# Patient Record
Sex: MALE | Race: WHITE
[De-identification: names, ages, dates, MRNs, and addresses within clinical notes are randomized per-mention and may not be internally consistent; named-entity substitution may affect disease eponyms.]

---

## 2020-03-13 ENCOUNTER — HOSPITAL ENCOUNTER (EMERGENCY)
Dept: HOSPITAL 62 - ER | Age: 45
Discharge: HOME | End: 2020-03-13
Payer: SELF-PAY

## 2020-03-13 VITALS — SYSTOLIC BLOOD PRESSURE: 128 MMHG | DIASTOLIC BLOOD PRESSURE: 82 MMHG

## 2020-03-13 DIAGNOSIS — F17.200: ICD-10-CM

## 2020-03-13 DIAGNOSIS — R05: ICD-10-CM

## 2020-03-13 DIAGNOSIS — R09.81: ICD-10-CM

## 2020-03-13 DIAGNOSIS — R51: ICD-10-CM

## 2020-03-13 DIAGNOSIS — B34.9: Primary | ICD-10-CM

## 2020-03-13 DIAGNOSIS — M79.10: ICD-10-CM

## 2020-03-13 DIAGNOSIS — R50.9: ICD-10-CM

## 2020-03-13 LAB
A TYPE INFLUENZA AG: NEGATIVE
B INFLUENZA AG: NEGATIVE

## 2020-03-13 PROCEDURE — 87804 INFLUENZA ASSAY W/OPTIC: CPT

## 2020-03-13 PROCEDURE — 99283 EMERGENCY DEPT VISIT LOW MDM: CPT

## 2020-03-13 NOTE — ER DOCUMENT REPORT
HPI





- HPI


Time Seen by Provider: 03/13/20 10:00


Pain Level: Denies


Notes: 





Patient is a 44-year-old male presenting to the emergency department chief 

complaint of cough, congestion, fever, chills and body aches that began 

yesterday.  [place]  Denies any nausea, vomiting, 

diarrhea.  Reports excessive fatigue.








- CONSTITUTIONAL


Constitutional: DENIES: Fever, Chills





- NEURO


Neurology: REPORTS: Headache





- RESPIRATORY


Respiratory: REPORTS: Coughing





Past Medical History





- General


Information source: Patient





- Social History


Smoking Status: Current Every Day Smoker


Frequency of alcohol use: None


Drug Abuse: None


Family History: Reviewed & Not Pertinent


Patient has suicidal ideation: No


Patient has homicidal ideation: No





- Medical History


Medical History: Negative


Surgical Hx: Negative





- Immunizations


Immunizations up to date: Yes





Vertical Provider Document





- CONSTITUTIONAL


Notes: 





PHYSICAL EXAMINATION:





GENERAL: Well-appearing, well-nourished and in no acute distress.





HEAD: Atraumatic, normocephalic.





EYES: Pupils equal round extraocular movements intact,  conjunctiva are normal.





ENT: Nares patent, oropharynx clear, nonerythematous, no tonsillar swelling, 

uvula midline.





NECK: Normal range of motion





LUNGS: No respiratory distress, lung sounds clear and equal bilaterally.  Cough 

noted.





Musculoskeletal: Normal range of motion





NEUROLOGICAL:  Normal speech, normal gait. 





PSYCH: Normal mood, normal affect.





SKIN: Warm, Dry, normal turgor, no rashes or lesions noted.





- INFECTION CONTROL


TRAVEL OUTSIDE OF THE [place] IN LAST 30 DAYS: No





Course





- Re-evaluation


Re-evalutation: 





Patient appears well, nontoxic, physical exam is unremarkable.  Likely viral 

upper respiratory illness.  Patient will be discharged home at this time.  

Patient is in agreements with plan.








- Vital Signs


Vital signs: 


                                        











Temp Pulse Resp BP Pulse Ox


 


 98.7 F   87   18   153/81 H  97 


 


 03/13/20 09:33  03/13/20 09:33  03/13/20 09:33  03/13/20 09:33  03/13/20 09:33














Discharge





- Discharge


Clinical Impression: 


 Viral illness





Condition: Stable


Disposition: HOME, SELF-CARE


Instructions:  Viral Syndrome ([facility])


Additional Instructions: 


Your influenza testing today was negative.  Light you likely have a viral 

illness.  Please push fluids.  Alternate Tylenol and ibuprofen.  Get plenty of 

rest.  Wash your hands.  Follow-up with your primary provider in 3 to 5 days if 

not improving or return to the emergency department if worsening.





Forms:  Return to Work

## 2020-07-28 ENCOUNTER — HOSPITAL ENCOUNTER (EMERGENCY)
Dept: HOSPITAL 62 - ER | Age: 45
Discharge: HOME | End: 2020-07-28
Payer: COMMERCIAL

## 2020-07-28 VITALS — SYSTOLIC BLOOD PRESSURE: 120 MMHG | DIASTOLIC BLOOD PRESSURE: 73 MMHG

## 2020-07-28 DIAGNOSIS — F17.200: ICD-10-CM

## 2020-07-28 DIAGNOSIS — L02.414: Primary | ICD-10-CM

## 2020-07-28 PROCEDURE — 99282 EMERGENCY DEPT VISIT SF MDM: CPT

## 2020-07-28 PROCEDURE — 87186 SC STD MICRODIL/AGAR DIL: CPT

## 2020-07-28 PROCEDURE — 87205 SMEAR GRAM STAIN: CPT

## 2020-07-28 PROCEDURE — 87070 CULTURE OTHR SPECIMN AEROBIC: CPT

## 2020-07-28 PROCEDURE — 87077 CULTURE AEROBIC IDENTIFY: CPT

## 2020-07-28 NOTE — ER DOCUMENT REPORT
HPI





- HPI


Time Seen by Provider: 07/28/20 14:28


Pain Level: 3


Context: 





Patient is a 44-year-old male who presents emergency department with a chief 

complaint of a wound to his left mid forearm.  Stated it was there about a week 

ago.  States it has drained purulent drainage.  Denies any history of MRSA.  

Denies any history of IV drug use.  He states that he might have been bit by a 

spider.  He does not take any medications.  Denies any past medical history.





- ROS


Systems Reviewed and Negative: Yes All other systems reviewed and negative





- CONSTITUTIONAL


Constitutional: REPORTS: Chills.  DENIES: Fever





- RESPIRATORY


Respiratory: DENIES: Trouble Breathing, Coughing





- GASTROINTESTINAL


Gastrointestinal: DENIES: Abdominal Pain, Nausea, Patient vomiting





- MUSCULOSKELETAL


Musculoskeletal: REPORTS: Extremity pain - Left forearm abscess





- DERM


Skin Color: Normal


Skin Problems: Pustule - Left forearm





Past Medical History





- General


Information source: Patient





- Social History


Smoking Status: Current Every Day Smoker


Frequency of alcohol use: None


Drug Abuse: None


Family History: Reviewed & Not Pertinent





- Immunizations


Immunizations up to date: Yes





Vertical Provider Document





- CONSTITUTIONAL


Agree With Documented VS: Yes


Exam Limitations: No Limitations


General Appearance: No Apparent Distress





- INFECTION CONTROL


TRAVEL OUTSIDE OF THE [place] IN LAST 30 DAYS: No





- HEENT


HEENT: Atraumatic, Normocephalic, PERRLA





- NECK


Neck: Normal Inspection





- RESPIRATORY


Respiratory: No Respiratory Distress





- CARDIOVASCULAR


Cardiovascular: Regular Rate, Regular Rhythm


Pulses: Normal: Radial





- MUSCULOSKELETAL/EXTREMETIES


Musculoskeletal/Extremeties: FROM





- NEURO


Level of Consciousness: Awake, Alert, Appropriate


Motor/Sensory: No Motor Deficit, No Sensory Deficit





- DERM


Integumentary: Warm, Dry, Abscess - Drained to left forearm





Course





- Re-evaluation


Re-evalutation: 





07/28/20 14:41


Patient's abscess was drained on its own.  We will start him on Bactrim.  

Culture was sent.  Patient is to follow-up with primary care provider as needed.

 Low suspicion for necrotizing fasciitis.  Follow-up precautions were given.  

Verbal discharge instructions were given to the patient.  They verbalized 

understanding.  They are stable for discharge.








- Vital Signs


Vital signs: 


                                        











Temp Pulse Resp BP Pulse Ox


 


 98.1 F   71   20   120/73   99 


 


 07/28/20 13:53  07/28/20 13:53  07/28/20 13:53  07/28/20 13:53  07/28/20 13:53














Discharge





- Discharge


Clinical Impression: 


 Abscess





Condition: Stable


Disposition: HOME, SELF-CARE


Instructions:  Abscess ([facility]), Trimethoprim-Sulfa ([facility])


Additional Instructions: 


You were seen today in the emergency department for an evaluation of your 

abscess.  Continue to drain it if it has any drainage.  Take the antibiotics as 

prescribed.  If it gets worse, return to the emergency department.


Prescriptions: 


Sulfamethoxazole/Trimethoprim [Bactrim Ds Tablet] 1 each PO BID 7 Days #14 

tablet


Referrals: 


RIKKI ALVES MD [NO LOCAL MD] - Follow up in 3-5 days